# Patient Record
Sex: MALE | Race: WHITE | NOT HISPANIC OR LATINO | URBAN - METROPOLITAN AREA
[De-identification: names, ages, dates, MRNs, and addresses within clinical notes are randomized per-mention and may not be internally consistent; named-entity substitution may affect disease eponyms.]

---

## 2018-01-11 NOTE — TELEPHONE ENCOUNTER
"APPT INFO    Date /Time: 1/22/18 7AM    Reason for Appt: Chronic pain syndrome (Migraine)    Ref Provider/Clinic: Jasbir ALLAN, [facility]    Are there internal records? Yes/No?  IF YES, list clinic names: No internal recs    Are there outside records? Yes/No? Yes    Patient Contact (Y/N) & Call Details: Yes called and spoke pt who states he doesn't know why his Doctor referred him to the U. He states \" I do not want to travel 600 miles just to have a consultation\" Pt states someone from the [facility] already has his recs. I will check with Pain [facility] to see if they have all of pt's recs and images.     Pt states he may or may not come to the appt due to the 600 miles drive for him, Pain Clinic's phone number was given to the pt to call if he needs to cancel appt.        Action: Faxed cover sheet to [facility] to mail CD incase [facility] doesn't have images      OUTSIDE RECORDS CHECKLIST     CLINIC NAME COMMENTS REC (x) IMG (x)   [facility] Jasbir ALLAN referring Recs Received & Fwd to Clinic  X    [facility]  Images done there        Records Received From:  [facility]     DATE/EXAM/LOCATION  (specify location if different)   Office Notes: 10/25/17, 10/17/17, 9/11/17  ([facility] Out Patient Progress Note) 911/17, 8/30/17    ED/Hospital Notes: 10/23/17   Operative Notes: Botox Injection 10/30/17    Bilateral occipital nerve blocks 9/11/17   Missing: ED 1/2017  MRI Brain 10/27/17, 2/23/12  MRI C Spine 10/24/17  (Dr. Martell) PCP @ [facility]            "

## 2018-01-16 NOTE — TELEPHONE ENCOUNTER
ACTION    What did you do? Faxed cover sheet to [facility] to mail CD or push related images through to  PACS.

## 2018-01-22 ENCOUNTER — PRE VISIT (OUTPATIENT)
Dept: ANESTHESIOLOGY | Facility: CLINIC | Age: 58
End: 2018-01-22